# Patient Record
(demographics unavailable — no encounter records)

---

## 2024-12-05 NOTE — REVIEW OF SYSTEMS
[Recent Change In Weight] : ~T no recent weight change [de-identified] : mild headaches remain at baseline think diminished in frequency post RT

## 2024-12-10 NOTE — HISTORY OF PRESENT ILLNESS
[FreeTextEntry1] : RT hx: GKRS RIGHT cerebellar x 2: RIGHT frontal x2: LEFT frontal 27GY over 3 Fxs 11/28-11/30/2023  INITIAL CONSULTATION: 11/13/2023 Ms. 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  Had definitive CRT treating to a total dose of 6000cGy in 30 fractions which she received from 8/10/2023-9/30/2023.  Started on durvalumab. Patient c/o n/v/dizziness/headache since receiving second dose of Imfinzi.  She describes feeling like she is "drunk" while walking.  MRI ordered identified three brain metastases, with a large symptomatic right cerebellar lesion. she presents to discuss treatment options.   Path:  Metastatic lung cancer with right frontal and right cerebellar metastasis. PDL1 95%, EGFR/ALK pending.  MEDICAL ONCOLOGIST: Dr. Aj PREVIOUS RADIATION: 60 Gy in 30 fractions, finished 9/30/23 to lung PACEMAKER OR ANTICOAGULATED: NO   MRI brain:  IMPRESSION: New large right cerebellar ring-enhancing mass consistent with a brain metastasis with significant vasogenic edema and mass effect on the fourth ventricle quadrigeminal plate cistern. Mild increased intracranial pressure. Right frontal subcortical ring-enhancing metastasis.  Additionally, on my review there is a third right sided lesion.  She is lightheaded when she gets up or lays down and had intermittent 6-7/10 headaches, has been in bed for past few days due to decreased balance and headaches. Also has trouble eating and drinking due to vomiting. She has eaten nothing since Monday.   Visit dated 2/13/2024 Patient returns for continuation of care with recent cranial images for review. Completed GRKS to multiple brain mets November 2023.  Overall doing well w/o any new concerns. Still with intermittent headaches which she thinks have been less frequent since her GKRS.  Initially Followed with Dr. Aj and was treated with Carbo/Taxol since 8/11/23. Now Enrolled in study at [facility] with Dr. Koch (Sponsor protocol # BI trial # [id number]) with cranial images completed every 6 weeks. She brings the most recent for review but unfortunately images were not able to be accessed from CD delivered.  MRI brain w w/o contrast 2/6/2024 completed at [facility]. Report states, 1) Decreased size of 6 mm peripherally enhancing lesion in the right frontal lobe. 2) Decreased size of 1.6 X 1.3 cm right cerebellar lesion, 3) complete resolution of surrounding vasogenic edema, 4) Resolution of punctate foci in right cerebral hemisphere and right frontal.   CT C/A/P 2/6/2024 - Increased probably infectious/inflammatory right upper and lower lobe consolidation now obscuring the cavitary anterior para-medistinal right upper lobe mass.  Other findings are unchanged included metastatic and right hilar adenopathy.  CT C/A/P 2/6/2024 At [facility] IMPRESSION:  1. Since 12/13/2023, markedly increased probably infectious/inflammatory right upper and lower lobe consolidation now obscuring the cavitary anterior paramediastinal right upper lobe mass 2. Other findings are unchanged including metastatic mediastinal and right hilar adenopathy.   Visit dated 12/10/2024 Patient presents for routine follow-up and progress check with completed cranial images for review. Enrolled in a trial at [facility] with Dr. Koch continues to complete cranial images every 6 weeks with the most recent with concerns for POD which she brings for review. Per patient she is w/o any new neurological deficits. Does note periods of fatigue and she does get "winded" at times with some activities. 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 Trial # BI [id number] (Zongertinib) reports tolerating protocol well.  PER patient CT C/A/P completed in November 2024 with MEREDITH  MRI brain w w/o contrast 11/22/2024 @[facility] IMPRESSION:  0.6cm Right enhancing lesion is unchanged or minimally increased from 10/16/2024, slowly increasing over multiple prior exams going back to 5/2/224 Stable cerebellar lesion. No new intracranial enhancing lesion.

## 2024-12-10 NOTE — REVIEW OF SYSTEMS
[Recent Change In Weight] : ~T no recent weight change [Negative] : Constitutional [de-identified] : mild headaches remains at baseline

## 2024-12-10 NOTE — PHYSICAL EXAM
[de-identified] : LIMITED visit TELEHEALTH [General Appearance - Well Developed] : well developed [Exaggerated Use Of Accessory Muscles For Inspiration] : no accessory muscle use [No Focal Deficits] : no focal deficits

## 2024-12-10 NOTE — HISTORY OF PRESENT ILLNESS
[FreeTextEntry1] : RT hx: GKRS RIGHT cerebellar x 2: RIGHT frontal x2: LEFT frontal 27GY over 3 Fxs 11/28-11/30/2023  INITIAL CONSULTATION: 11/13/2023 Ms. 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  Had definitive CRT treating to a total dose of 6000cGy in 30 fractions which she received from 8/10/2023-9/30/2023.  Started on durvalumab. Patient c/o n/v/dizziness/headache since receiving second dose of Imfinzi.  She describes feeling like she is "drunk" while walking.  MRI ordered identified three brain metastases, with a large symptomatic right cerebellar lesion. she presents to discuss treatment options.   Path:  Metastatic lung cancer with right frontal and right cerebellar metastasis. PDL1 95%, EGFR/ALK pending.  MEDICAL ONCOLOGIST: Dr. Aj PREVIOUS RADIATION: 60 Gy in 30 fractions, finished 9/30/23 to lung PACEMAKER OR ANTICOAGULATED: NO   MRI brain:  IMPRESSION: New large right cerebellar ring-enhancing mass consistent with a brain metastasis with significant vasogenic edema and mass effect on the fourth ventricle quadrigeminal plate cistern. Mild increased intracranial pressure. Right frontal subcortical ring-enhancing metastasis.  Additionally, on my review there is a third right sided lesion.  She is lightheaded when she gets up or lays down and had intermittent 6-7/10 headaches, has been in bed for past few days due to decreased balance and headaches. Also has trouble eating and drinking due to vomiting. She has eaten nothing since Monday.   Visit dated 2/13/2024 Patient returns for continuation of care with recent cranial images for review. Completed GRKS to multiple brain mets November 2023.  Overall doing well w/o any new concerns. Still with intermittent headaches which she thinks have been less frequent since her GKRS.  Initially Followed with Dr. Aj and was treated with Carbo/Taxol since 8/11/23. Now Enrolled in study at [facility] with Dr. Koch (Sponsor protocol # BI trial # [id number]) with cranial images completed every 6 weeks. She brings the most recent for review but unfortunately images were not able to be accessed from CD delivered.  MRI brain w w/o contrast 2/6/2024 completed at [facility]. Report states, 1) Decreased size of 6 mm peripherally enhancing lesion in the right frontal lobe. 2) Decreased size of 1.6 X 1.3 cm right cerebellar lesion, 3) complete resolution of surrounding vasogenic edema, 4) Resolution of punctate foci in right cerebral hemisphere and right frontal.   CT C/A/P 2/6/2024 - Increased probably infectious/inflammatory right upper and lower lobe consolidation now obscuring the cavitary anterior para-medistinal right upper lobe mass.  Other findings are unchanged included metastatic and right hilar adenopathy.  CT C/A/P 2/6/2024 At [facility] IMPRESSION:  1. Since 12/13/2023, markedly increased probably infectious/inflammatory right upper and lower lobe consolidation now obscuring the cavitary anterior paramediastinal right upper lobe mass 2. Other findings are unchanged including metastatic mediastinal and right hilar adenopathy.   Visit dated 12/10/2024 Patient presents for routine follow-up and progress check with completed cranial images for review. Enrolled in a trial at [facility] with Dr. Koch continues to complete cranial images every 6 weeks with the most recent with concerns for POD which she brings for review. Per patient she is w/o any new neurological deficits. Does note periods of fatigue and she does get "winded" at times with some activities. 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 Trial # BI [id number] (Zongertinib) reports tolerating protocol well.  PER patient CT C/A/P completed in November 2024 with MEREDITH  MRI brain w w/o contrast 11/22/2024 @[facility] IMPRESSION:  0.6cm Right enhancing lesion is unchanged or minimally increased from 10/16/2024, slowly increasing over multiple prior exams going back to 5/2/224 Stable cerebellar lesion. No new intracranial enhancing lesion.

## 2024-12-10 NOTE — REVIEW OF SYSTEMS
[Recent Change In Weight] : ~T no recent weight change [Negative] : Constitutional [de-identified] : mild headaches remains at baseline

## 2024-12-10 NOTE — PHYSICAL EXAM
[de-identified] : LIMITED visit TELEHEALTH [General Appearance - Well Developed] : well developed [Exaggerated Use Of Accessory Muscles For Inspiration] : no accessory muscle use [No Focal Deficits] : no focal deficits

## 2025-02-23 NOTE — PHYSICAL EXAM
[Exaggerated Use Of Accessory Muscles For Inspiration] : no accessory muscle use [General Appearance - Well Developed] : well developed [No Focal Deficits] : no focal deficits [Oriented To Time, Place, And Person] : oriented to person, place, and time

## 2025-02-23 NOTE — PHYSICAL EXAM
[General Appearance - Well Developed] : well developed [Exaggerated Use Of Accessory Muscles For Inspiration] : no accessory muscle use [No Focal Deficits] : no focal deficits [Oriented To Time, Place, And Person] : oriented to person, place, and time

## 2025-02-25 NOTE — REVIEW OF SYSTEMS
[Negative] : Heme/Lymph [Fatigue] : fatigue [Insomnia] : insomnia [de-identified] : mild headaches at   baseline

## 2025-02-25 NOTE — REVIEW OF SYSTEMS
[Negative] : Heme/Lymph [Fatigue] : fatigue [Insomnia] : insomnia [de-identified] : mild headaches at   baseline

## 2025-02-25 NOTE — HISTORY OF PRESENT ILLNESS
[FreeTextEntry1] : RT hx: GKRS RIGHT cerebellar x 2: RIGHT frontal x2: LEFT frontal 27GY over 3 Fxs 11/28-11/30/2023  INITIAL CONSULTATION: 11/13/2023 Ms. 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  Had definitive CRT treating to a total dose of 6000cGy in 30 fractions which she received from 8/10/2023-9/30/2023.  Started on durvalumab. Patient c/o n/v/dizziness/headache since receiving second dose of Imfinzi.  She describes feeling like she is "drunk" while walking.  MRI ordered identified three brain metastases, with a large symptomatic right cerebellar lesion. she presents to discuss treatment options.   Path:  Metastatic lung cancer with right frontal and right cerebellar metastasis. PDL1 95%, EGFR/ALK pending.  MEDICAL ONCOLOGIST: Dr. Aj PREVIOUS RADIATION: 60 Gy in 30 fractions, finished 9/30/23 to lung PACEMAKER OR ANTICOAGULATED: NO   MRI brain:  IMPRESSION: New large right cerebellar ring-enhancing mass consistent with a brain metastasis with significant vasogenic edema and mass effect on the fourth ventricle quadrigeminal plate cistern. Mild increased intracranial pressure. Right frontal subcortical ring-enhancing metastasis.  Additionally, on my review there is a third right sided lesion.  She is lightheaded when she gets up or lays down and had intermittent 6-7/10 headaches, has been in bed for past few days due to decreased balance and headaches. Also has trouble eating and drinking due to vomiting. She has eaten nothing since Monday.   Visit dated 2/13/2024 Patient returns for continuation of care with recent cranial images for review. Completed GRKS to multiple brain mets November 2023.  Overall doing well w/o any new concerns. Still with intermittent headaches which she thinks have been less frequent since her GKRS.  Initially Followed with Dr. Aj and was treated with Carbo/Taxol since 8/11/23. Now Enrolled in study at [facility] with Dr. Koch (Sponsor protocol # BI trial # [id number]) with cranial images completed every 6 weeks. She brings the most recent for review but unfortunately images were not able to be accessed from CD delivered.  MRI brain w w/o contrast 2/6/2024 completed at [facility]. Report states, 1) Decreased size of 6 mm peripherally enhancing lesion in the right frontal lobe. 2) Decreased size of 1.6 X 1.3 cm right cerebellar lesion, 3) complete resolution of surrounding vasogenic edema, 4) Resolution of punctate foci in right cerebral hemisphere and right frontal.   CT C/A/P 2/6/2024 - Increased probably infectious/inflammatory right upper and lower lobe consolidation now obscuring the cavitary anterior para-medistinal right upper lobe mass.  Other findings are unchanged included metastatic and right hilar adenopathy.  CT C/A/P 2/6/2024 At [facility] IMPRESSION:  1. Since 12/13/2023, markedly increased probably infectious/inflammatory right upper and lower lobe consolidation now obscuring the cavitary anterior paramediastinal right upper lobe mass 2. Other findings are unchanged including metastatic mediastinal and right hilar adenopathy.   Visit dated 12/10/2024 Patient presents for routine follow-up and progress check with completed cranial images for review. Enrolled in a trial at [facility] with Dr. Koch continues to complete cranial images every 6 weeks with the most recent with concerns for POD which she brings for review. Per patient she is w/o any new neurological deficits. Does note periods of fatigue and she does get "winded" at times with some activities. 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 Trial # BI [id number] (Zongertinib) reports tolerating protocol well.  PER patient CT C/A/P completed in November 2024 with MEREDITH  MRI brain w w/o contrast 11/22/2024 @[facility] IMPRESSION:  0.6cm Right enhancing lesion is unchanged or minimally increased from 10/16/2024, slowly increasing over multiple prior exams going back to 5/2/224 Stable cerebellar lesion. No new intracranial enhancing lesion.  VISIT DATED: 2/25/2025 Ms. Marquez returns for continuation of care. States she has been feeling "rundown" but attributes this to a busy few weeks in her personal life, also noticed her baseline headaches seemed for frequent. Over the past few days though her headaches have returned to her baseline. Notes insomnia and periods of forgetfulness when falling to sleep. Denies gait disturbance Otherwise, she is not bothered by any new concerns  Continues to be enrolled in trial at [facility] with Dr. Koch  MRI brain w w/o contrast 2/18/2025 IMPRESSION: Stable right cerebellar enhancing lesion. Stable to minimally enlarged right frontal enhancing lesion. No new enhancing lesion.  CT C/A/P with contrast 2/18/2025 ([facility]) IMPRESSION: Since 1/7/2025, unchanged thoracic adenopathy and right lung post treatment consolidation. No evidence of  new metastatic disease.

## 2025-02-25 NOTE — HISTORY OF PRESENT ILLNESS
[FreeTextEntry1] : RT hx: GKRS RIGHT cerebellar x 2: RIGHT frontal x2: LEFT frontal 27GY over 3 Fxs 11/28-11/30/2023  INITIAL CONSULTATION: 11/13/2023 Ms. 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  Had definitive CRT treating to a total dose of 6000cGy in 30 fractions which she received from 8/10/2023-9/30/2023.  Started on durvalumab. Patient c/o n/v/dizziness/headache since receiving second dose of Imfinzi.  She describes feeling like she is "drunk" while walking.  MRI ordered identified three brain metastases, with a large symptomatic right cerebellar lesion. she presents to discuss treatment options.   Path:  Metastatic lung cancer with right frontal and right cerebellar metastasis. PDL1 95%, EGFR/ALK pending.  MEDICAL ONCOLOGIST: Dr. Aj PREVIOUS RADIATION: 60 Gy in 30 fractions, finished 9/30/23 to lung PACEMAKER OR ANTICOAGULATED: NO   MRI brain:  IMPRESSION: New large right cerebellar ring-enhancing mass consistent with a brain metastasis with significant vasogenic edema and mass effect on the fourth ventricle quadrigeminal plate cistern. Mild increased intracranial pressure. Right frontal subcortical ring-enhancing metastasis.  Additionally, on my review there is a third right sided lesion.  She is lightheaded when she gets up or lays down and had intermittent 6-7/10 headaches, has been in bed for past few days due to decreased balance and headaches. Also has trouble eating and drinking due to vomiting. She has eaten nothing since Monday.   Visit dated 2/13/2024 Patient returns for continuation of care with recent cranial images for review. Completed GRKS to multiple brain mets November 2023.  Overall doing well w/o any new concerns. Still with intermittent headaches which she thinks have been less frequent since her GKRS.  Initially Followed with Dr. Aj and was treated with Carbo/Taxol since 8/11/23. Now Enrolled in study at [facility] with Dr. Koch (Sponsor protocol # BI trial # [id number]) with cranial images completed every 6 weeks. She brings the most recent for review but unfortunately images were not able to be accessed from CD delivered.  MRI brain w w/o contrast 2/6/2024 completed at [facility]. Report states, 1) Decreased size of 6 mm peripherally enhancing lesion in the right frontal lobe. 2) Decreased size of 1.6 X 1.3 cm right cerebellar lesion, 3) complete resolution of surrounding vasogenic edema, 4) Resolution of punctate foci in right cerebral hemisphere and right frontal.   CT C/A/P 2/6/2024 - Increased probably infectious/inflammatory right upper and lower lobe consolidation now obscuring the cavitary anterior para-medistinal right upper lobe mass.  Other findings are unchanged included metastatic and right hilar adenopathy.  CT C/A/P 2/6/2024 At [facility] IMPRESSION:  1. Since 12/13/2023, markedly increased probably infectious/inflammatory right upper and lower lobe consolidation now obscuring the cavitary anterior paramediastinal right upper lobe mass 2. Other findings are unchanged including metastatic mediastinal and right hilar adenopathy.   Visit dated 12/10/2024 Patient presents for routine follow-up and progress check with completed cranial images for review. Enrolled in a trial at [facility] with Dr. Koch continues to complete cranial images every 6 weeks with the most recent with concerns for POD which she brings for review. Per patient she is w/o any new neurological deficits. Does note periods of fatigue and she does get "winded" at times with some activities. 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 Trial # [id number] (Zongertinib) reports tolerating protocol well.  PER patient CT C/A/P completed in November 2024 with MEREDITH  MRI brain w w/o contrast 11/22/2024 @[facility] IMPRESSION:  0.6cm Right enhancing lesion is unchanged or minimally increased from 10/16/2024, slowly increasing over multiple prior exams going back to 5/2/224 Stable cerebellar lesion. No new intracranial enhancing lesion.  VISIT DATED: 2/25/2025 Ms. Marquez returns for continuation of care. States she has been feeling "rundown" but attributes this to a busy few weeks in her personal life, also noticed her baseline headaches seemed for frequent. Over the past few days though her headaches have returned to her baseline. Notes insomnia and periods of forgetfulness when falling to sleep. Denies gait disturbance Otherwise, she is not bothered by any new concerns  Continues to be enrolled in trial at [facility] with Dr. Koch  MRI brain w w/o contrast 2/18/2025 IMPRESSION: Stable right cerebellar enhancing lesion. Stable to minimally enlarged right frontal enhancing lesion. No new enhancing lesion.  CT C/A/P with contrast 2/18/2025 ([facility]) IMPRESSION: Since 1/7/2025, unchanged thoracic adenopathy and right lung post treatment consolidation. No evidence of  new metastatic disease.

## 2025-02-25 NOTE — HISTORY OF PRESENT ILLNESS
[FreeTextEntry1] : RT hx: GKRS RIGHT cerebellar x 2: RIGHT frontal x2: LEFT frontal 27GY over 3 Fxs 11/28-11/30/2023  INITIAL CONSULTATION: 11/13/2023 Ms. 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  Had definitive CRT treating to a total dose of 6000cGy in 30 fractions which she received from 8/10/2023-9/30/2023.  Started on durvalumab. Patient c/o n/v/dizziness/headache since receiving second dose of Imfinzi.  She describes feeling like she is "drunk" while walking.  MRI ordered identified three brain metastases, with a large symptomatic right cerebellar lesion. she presents to discuss treatment options.   Path:  Metastatic lung cancer with right frontal and right cerebellar metastasis. PDL1 95%, EGFR/ALK pending.  MEDICAL ONCOLOGIST: Dr. Aj PREVIOUS RADIATION: 60 Gy in 30 fractions, finished 9/30/23 to lung PACEMAKER OR ANTICOAGULATED: NO   MRI brain:  IMPRESSION: New large right cerebellar ring-enhancing mass consistent with a brain metastasis with significant vasogenic edema and mass effect on the fourth ventricle quadrigeminal plate cistern. Mild increased intracranial pressure. Right frontal subcortical ring-enhancing metastasis.  Additionally, on my review there is a third right sided lesion.  She is lightheaded when she gets up or lays down and had intermittent 6-7/10 headaches, has been in bed for past few days due to decreased balance and headaches. Also has trouble eating and drinking due to vomiting. She has eaten nothing since Monday.   Visit dated 2/13/2024 Patient returns for continuation of care with recent cranial images for review. Completed GRKS to multiple brain mets November 2023.  Overall doing well w/o any new concerns. Still with intermittent headaches which she thinks have been less frequent since her GKRS.  Initially Followed with Dr. Aj and was treated with Carbo/Taxol since 8/11/23. Now Enrolled in study at [facility] with Dr. Koch (Sponsor protocol # BI trial # [id number]) with cranial images completed every 6 weeks. She brings the most recent for review but unfortunately images were not able to be accessed from CD delivered.  MRI brain w w/o contrast 2/6/2024 completed at [facility]. Report states, 1) Decreased size of 6 mm peripherally enhancing lesion in the right frontal lobe. 2) Decreased size of 1.6 X 1.3 cm right cerebellar lesion, 3) complete resolution of surrounding vasogenic edema, 4) Resolution of punctate foci in right cerebral hemisphere and right frontal.   CT C/A/P 2/6/2024 - Increased probably infectious/inflammatory right upper and lower lobe consolidation now obscuring the cavitary anterior para-medistinal right upper lobe mass.  Other findings are unchanged included metastatic and right hilar adenopathy.  CT C/A/P 2/6/2024 At [facility] IMPRESSION:  1. Since 12/13/2023, markedly increased probably infectious/inflammatory right upper and lower lobe consolidation now obscuring the cavitary anterior paramediastinal right upper lobe mass 2. Other findings are unchanged including metastatic mediastinal and right hilar adenopathy.   Visit dated 12/10/2024 Patient presents for routine follow-up and progress check with completed cranial images for review. Enrolled in a trial at [facility] with Dr. Koch continues to complete cranial images every 6 weeks with the most recent with concerns for POD which she brings for review. Per patient she is w/o any new neurological deficits. Does note periods of fatigue and she does get "winded" at times with some activities. 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 Trial # BI [id number] (Zongertinib) reports tolerating protocol well.  PER patient CT C/A/P completed in November 2024 with MEREDITH  MRI brain w w/o contrast 11/22/2024 @[facility] IMPRESSION:  0.6cm Right enhancing lesion is unchanged or minimally increased from 10/16/2024, slowly increasing over multiple prior exams going back to 5/2/224 Stable cerebellar lesion. No new intracranial enhancing lesion.  VISIT DATED: 2/25/2025 Ms. Marquez returns for continuation of care. States she has been feeling "rundown" but attributes this to a busy few weeks in her personal life, also noticed her baseline headaches seemed for frequent. Over the past few days though her headaches have returned to her baseline. Notes insomnia and periods of forgetfulness when falling to sleep. Denies gait disturbance Otherwise, she is not bothered by any new concerns  Continues to be enrolled in trial at [facility] with Dr. Koch  MRI brain w w/o contrast 2/18/2025 IMPRESSION: Stable right cerebellar enhancing lesion. Stable to minimally enlarged right frontal enhancing lesion. No new enhancing lesion.  CT C/A/P with contrast 2/18/2025 ([facility]) IMPRESSION: Since 1/7/2025, unchanged thoracic adenopathy and right lung post treatment consolidation. No evidence of  new metastatic disease.

## 2025-02-25 NOTE — REVIEW OF SYSTEMS
[Negative] : Heme/Lymph [Fatigue] : fatigue [Insomnia] : insomnia [de-identified] : mild headaches at   baseline

## 2025-05-14 NOTE — HISTORY OF PRESENT ILLNESS
[FreeTextEntry1] : RT hx: GKRS RIGHT cerebellar x 2: RIGHT frontal x2: LEFT frontal 27GY over 3 Fxs 11/28-11/30/2023  INITIAL CONSULTATION: 11/13/2023 Ms. 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  Had definitive CRT treating to a total dose of 6000cGy in 30 fractions which she received from 8/10/2023-9/30/2023.  Started on durvalumab. Patient c/o n/v/dizziness/headache since receiving second dose of Imfinzi.  She describes feeling like she is "drunk" while walking.  MRI ordered identified three brain metastases, with a large symptomatic right cerebellar lesion. she presents to discuss treatment options.   Path:  Metastatic lung cancer with right frontal and right cerebellar metastasis. PDL1 95%, EGFR/ALK pending.  MEDICAL ONCOLOGIST: Dr. Aj PREVIOUS RADIATION: 60 Gy in 30 fractions, finished 9/30/23 to lung PACEMAKER OR ANTICOAGULATED: NO   MRI brain:  IMPRESSION: New large right cerebellar ring-enhancing mass consistent with a brain metastasis with significant vasogenic edema and mass effect on the fourth ventricle quadrigeminal plate cistern. Mild increased intracranial pressure. Right frontal subcortical ring-enhancing metastasis.  Additionally, on my review there is a third right sided lesion.  She is lightheaded when she gets up or lays down and had intermittent 6-7/10 headaches, has been in bed for past few days due to decreased balance and headaches. Also has trouble eating and drinking due to vomiting. She has eaten nothing since Monday.   Visit dated 2/13/2024 Patient returns for continuation of care with recent cranial images for review. Completed GRKS to multiple brain mets November 2023.  Overall doing well w/o any new concerns. Still with intermittent headaches which she thinks have been less frequent since her GKRS.  Initially Followed with Dr. Aj and was treated with Carbo/Taxol since 8/11/23. Now Enrolled in study at [facility] with Dr. Koch (Sponsor protocol # BI trial # [id number]) with cranial images completed every 6 weeks. She brings the most recent for review but unfortunately images were not able to be accessed from CD delivered.  MRI brain w w/o contrast 2/6/2024 completed at [facility]. Report states, 1) Decreased size of 6 mm peripherally enhancing lesion in the right frontal lobe. 2) Decreased size of 1.6 X 1.3 cm right cerebellar lesion, 3) complete resolution of surrounding vasogenic edema, 4) Resolution of punctate foci in right cerebral hemisphere and right frontal.   CT C/A/P 2/6/2024 - Increased probably infectious/inflammatory right upper and lower lobe consolidation now obscuring the cavitary anterior para-medistinal right upper lobe mass.  Other findings are unchanged included metastatic and right hilar adenopathy.  CT C/A/P 2/6/2024 At [facility] IMPRESSION:  1. Since 12/13/2023, markedly increased probably infectious/inflammatory right upper and lower lobe consolidation now obscuring the cavitary anterior paramediastinal right upper lobe mass 2. Other findings are unchanged including metastatic mediastinal and right hilar adenopathy.   Visit dated 12/10/2024 Patient presents for routine follow-up and progress check with completed cranial images for review. Enrolled in a trial at [facility] with Dr. Koch continues to complete cranial images every 6 weeks with the most recent with concerns for POD which she brings for review. Per patient she is w/o any new neurological deficits. Does note periods of fatigue and she does get "winded" at times with some activities. 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 Trial # BI [id number] (Zongertinib) reports tolerating protocol well.  PER patient CT C/A/P completed in November 2024 with MEREDITH  MRI brain w w/o contrast 11/22/2024 @[facility] IMPRESSION:  0.6cm Right enhancing lesion is unchanged or minimally increased from 10/16/2024, slowly increasing over multiple prior exams going back to 5/2/224 Stable cerebellar lesion. No new intracranial enhancing lesion.  VISIT DATED: 2/25/2025 Ms. Cunningham returns for continuation of care. States she has been feeling "rundown" but attributes this to a busy few weeks in her personal life, also noticed her baseline headaches seemed for frequent. Over the past few days though her headaches have returned to her baseline. Notes insomnia and periods of forgetfulness when falling to sleep. Denies gait disturbance Otherwise, she is not bothered by any new concerns  Continues to be enrolled in trial at [facility] with Dr. Koch  MRI brain w w/o contrast 2/18/2025 IMPRESSION: Stable right cerebellar enhancing lesion. Stable to minimally enlarged right frontal enhancing lesion. No new enhancing lesion.  CT C/A/P with contrast 2/18/2025 ([facility]) IMPRESSION: Since 1/7/2025, unchanged thoracic adenopathy and right lung post treatment consolidation. No evidence of new metastatic disease.  VISIT DATED:  5/21/2025 Ms. cunningham returns for progress check. She continues to be enrolled in a trial at [facility] and brings recently completed images for our review. She is under the care of Dr. Koch and get images every 6 weeks as per protocol for trial enrollment. Trial # BI [id number] (Zongertinib) ????? States *****  MRI brain w w/o contrast

## 2025-05-14 NOTE — REVIEW OF SYSTEMS
[Fatigue] : fatigue [Insomnia] : insomnia [Negative] : Heme/Lymph [de-identified] : mild headaches at   baseline

## 2025-06-03 NOTE — PHYSICAL EXAM
[Chaperoned Physical Exam] : A chaperone was present in the examining room during all aspects of the physical examination. [FreeTextEntry2] : Sukhi Alexander [FreeTextEntry1] : medical scribe [Appropriately responsive] : appropriately responsive [Alert] : alert [No Acute Distress] : no acute distress [No Lymphadenopathy] : no lymphadenopathy [Regular Rate Rhythm] : regular rate rhythm [No Murmurs] : no murmurs [Clear to Auscultation B/L] : clear to auscultation bilaterally [Soft] : soft [Non-tender] : non-tender [Non-distended] : non-distended [No HSM] : No HSM [No Lesions] : no lesions [No Mass] : no mass [Oriented x3] : oriented x3 [Examination Of The Breasts] : a normal appearance [No Masses] : no breast masses were palpable [Labia Majora] : normal [Labia Minora] : normal [Normal] : normal [Uterine Adnexae] : normal [FreeTextEntry9] : Guaiac negative, no masses

## 2025-06-03 NOTE — PLAN
[FreeTextEntry1] : Health Maintenance: Normal gyn examination. Pap smear performed. Rx given for mammogram and breast sonogram. Pt will check w/ medical onc if it is needed, given her routine scanning interval.  F/u with Internist for screening laboratories. Nutrition and exercised discussed.  Vaginal Dryness: Advised OTC lubricants, coconut oil D/w pt R/B topical vaginal estrogen if no improvement in sxs. Pt to discuss further w/ medical onc.  RTO in 1 year, or PRN.

## 2025-06-03 NOTE — END OF VISIT
[FreeTextEntry3] : I attest that the scribe was present and chaperoned the entire encounter. I saw the patient, performed the examination, and reviewed and edited the note.

## 2025-06-03 NOTE — SIGNATURES
[TextEntry] : This note was authored by Sukhi Alexander working as a scribe for Dr. Ponce Pearson.   I, Dr. Ponce Pearson, have reviewed the content of this note and confirm it is true and accurate. I personally performed the history and physical examination and made all the decisions. 06/03/2025

## 2025-06-03 NOTE — HISTORY OF PRESENT ILLNESS
[FreeTextEntry1] : 06/03/2025 LIBIA SIMPSON 53-year-old female presents for an annual gyn exam.  Patient offers complaints of vaginal dryness, for which she just started Replens vaginal moisturizer.   No abnormal vaginal bleeding, pain, or vaginal discharge.  Pt dx'd w/ lung ca in 2023. Currently taking zongertinib as part of trial at [facility]. She gets scans q6 wks. Pt reports breasts are imaged at the same time.   Reviewed today's pelvic sonogram: thin endometrium, dominant anterior fibroid measures smaller at 5.8 x 4.9 x 4.8cm (was 7.3 x 5x4 x 8.3cm), normal ovaries.  Colonoscopy consult scheduled.  Denies changes in medical status, medications, serious illness, hospitalizations, and surgeries. Reviewed patient's current medications.  MedHx: lung ca SHx: C/S x 2, bronchoscopy FamHx: mother w/ uterine ca Meds: zongertinib Allergies: NKDA